# Patient Record
Sex: MALE | Race: WHITE | NOT HISPANIC OR LATINO | URBAN - METROPOLITAN AREA
[De-identification: names, ages, dates, MRNs, and addresses within clinical notes are randomized per-mention and may not be internally consistent; named-entity substitution may affect disease eponyms.]

---

## 2017-03-08 ENCOUNTER — APPOINTMENT (RX ONLY)
Dept: URBAN - METROPOLITAN AREA CLINIC 43 | Facility: CLINIC | Age: 62
Setting detail: DERMATOLOGY
End: 2017-03-08

## 2017-03-08 DIAGNOSIS — L71.8 OTHER ROSACEA: ICD-10-CM

## 2017-03-08 DIAGNOSIS — Z79.899 OTHER LONG TERM (CURRENT) DRUG THERAPY: ICD-10-CM

## 2017-03-08 PROBLEM — K75.9 INFLAMMATORY LIVER DISEASE, UNSPECIFIED: Status: ACTIVE | Noted: 2017-03-08

## 2017-03-08 PROCEDURE — 99202 OFFICE O/P NEW SF 15 MIN: CPT

## 2017-03-08 PROCEDURE — ? HIGH RISK MEDICATION MONITORING

## 2017-03-08 PROCEDURE — ? PRESCRIPTION

## 2017-03-08 PROCEDURE — ? COUNSELING

## 2017-03-08 PROCEDURE — ? TREATMENT REGIMEN

## 2017-03-08 RX ORDER — DOXYCYCLINE HYCLATE 100 MG/1
CAPSULE, GELATIN COATED ORAL
Qty: 60 | Refills: 2 | Status: ERX | COMMUNITY
Start: 2017-03-08

## 2017-03-08 RX ORDER — METRONIDAZOLE 7.5 MG/G
GEL TOPICAL
Qty: 1 | Refills: 6 | Status: ERX | COMMUNITY
Start: 2017-03-08

## 2017-03-08 RX ADMIN — DOXYCYCLINE HYCLATE: 100 CAPSULE, GELATIN COATED ORAL at 17:56

## 2017-03-08 RX ADMIN — METRONIDAZOLE: 7.5 GEL TOPICAL at 17:56

## 2017-03-08 ASSESSMENT — LOCATION SIMPLE DESCRIPTION DERM
LOCATION SIMPLE: LEFT CHEEK
LOCATION SIMPLE: NOSE
LOCATION SIMPLE: RIGHT CHEEK

## 2017-03-08 ASSESSMENT — LOCATION DETAILED DESCRIPTION DERM
LOCATION DETAILED: RIGHT MEDIAL MALAR CHEEK
LOCATION DETAILED: NASAL DORSUM
LOCATION DETAILED: LEFT MEDIAL MALAR CHEEK

## 2017-03-08 ASSESSMENT — LOCATION ZONE DERM
LOCATION ZONE: NOSE
LOCATION ZONE: FACE

## 2017-03-08 NOTE — PROCEDURE: TREATMENT REGIMEN
Plan: Recheck 6 weeks \\nIf great improvement then fine to decrease to once daily doxy use until follow up appointment
Detail Level: Zone

## 2017-04-28 ENCOUNTER — APPOINTMENT (RX ONLY)
Dept: URBAN - METROPOLITAN AREA CLINIC 43 | Facility: CLINIC | Age: 62
Setting detail: DERMATOLOGY
End: 2017-04-28

## 2017-04-28 DIAGNOSIS — L71.8 OTHER ROSACEA: ICD-10-CM

## 2017-04-28 PROCEDURE — ? TREATMENT REGIMEN

## 2017-04-28 PROCEDURE — 99212 OFFICE O/P EST SF 10 MIN: CPT

## 2017-04-28 ASSESSMENT — LOCATION SIMPLE DESCRIPTION DERM
LOCATION SIMPLE: RIGHT CHEEK
LOCATION SIMPLE: LEFT CHEEK

## 2017-04-28 ASSESSMENT — LOCATION DETAILED DESCRIPTION DERM
LOCATION DETAILED: RIGHT CENTRAL MALAR CHEEK
LOCATION DETAILED: LEFT CENTRAL MALAR CHEEK

## 2017-04-28 ASSESSMENT — LOCATION ZONE DERM: LOCATION ZONE: FACE

## 2017-04-28 NOTE — PROCEDURE: TREATMENT REGIMEN
Plan: Rosacea now well controlled with twice weekly metronidazole\\nContinue for now\\nIf flares, increase to twice daily metronidazole topical and if more severe flares then return to oral doxycycline\\nCall if problems\\nAnnual recheck
Detail Level: Zone